# Patient Record
(demographics unavailable — no encounter records)

---

## 2024-12-16 NOTE — ASSESSMENT
[FreeTextEntry1] : 56-year-old man with sleep apnea and insomnia makes his condition complicated to deal with.  With several changes in his inspire settings finally we were able to adjust his device to 2.1 V with the 120/40 Hz for comfort.  Insomnia is fairly controlled with Lunesta 3 mg, patient understands long-term complications of this medication but he is has tried every other medication without any success.

## 2024-12-16 NOTE — HISTORY OF PRESENT ILLNESS
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 Patient was diagnosed with severe sleep apnea with AHI of 33 in 2021, he was not able to tolerate CPAP therapy and returned the equipment to DME company.  He underwent inspire placement november 22 of 2022.     symptoms prior to inspire : Patient is sleepy with [place] sleepiness score of 14.  Patient has very loud snoring, does not have any witnessed apneas.  Patient's bedtime is around 11 PM wakes up in the morning around 5 AM.  He feels tired when he wakes up. Patient also has frequent headaches, no nocturia.  HIs sleep is disturbed due to frequent awakenings. Ambien is helping with sleep quality for now. WIll stop it after inspire issue is addressed.  symptoms on inspire :  Patient is not sleepy with [place] sleepiness score of 4.  Patient's bedtime is around 11 PM wakes up in the morning around 6 AM.  He feels tired when he wakes up. He is getting about 4-5 hrs of sleep a night. Patient also has frequent headaches, no nocturia.  His sleep is not as disturbed with temazepam.   For insomnia - patient is not able to sleep well with ambien. Patient also has failed belsomra, quviviq and dayvigo. He is doing better with temazepam 15 mg at bedtime or lunesta 3 mg. He prefers lunesta as he is not groggy during the day.   sensation threshold 1.4 v functional threshold 1.8 v  he is on 1.8-2.8 v setting, +-+ configuration. Currently on 2.1 v setting. He is doing well with it ahi on the last study is 2.2. average usage has improved on inspire, now able to use more than 5 hrs. He has some discomfort with 2.1v, adjusted today to below setting which seems less uncomfortable. adjusted to 2.1 v, 120/40 hz

## 2025-06-09 NOTE — ASSESSMENT
[FreeTextEntry1] : 56-year-old man with sleep apnea and insomnia makes his condition complicated to deal with.  With several changes in his inspire settings finally we were able to adjust his device to 2.1 V with the 120/40 Hz for comfort.  Insomnia is fairly controlled with Lunesta 3 mg, patient understands long-term complications of this medication but he is has tried every other medication without any success.  Current usage of inspire is 3 hrs, He will try to increase the usage. Also considering zepbound (other glp 1's) as he is diagnosed with diabetes as well.

## 2025-06-09 NOTE — HISTORY OF PRESENT ILLNESS
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 Patient was diagnosed with severe sleep apnea with AHI of 33 in 2021, he was not able to tolerate CPAP therapy and returned the equipment to DME company.  He underwent inspire placement november 22 of 2022.     symptoms prior to inspire : Patient is sleepy with [place] sleepiness score of 14.  Patient has very loud snoring, does not have any witnessed apneas.  Patient's bedtime is around 11 PM wakes up in the morning around 5 AM.  He feels tired when he wakes up. Patient also has frequent headaches, no nocturia.  HIs sleep is disturbed due to frequent awakenings. Ambien is helping with sleep quality for now. WIll stop it after inspire issue is addressed.  symptoms on inspire :  Patient is not sleepy with [place] sleepiness score of 4.  Patient's bedtime is around 11 PM wakes up in the morning around 6 AM.  He feels tired when he wakes up. He is getting about 4-5 hrs of sleep a night. Patient also has frequent headaches, no nocturia.  His sleep is not as disturbed with temazepam.   For insomnia - patient is not able to sleep well with ambien. Patient also has failed belsomra, quviviq and dayvigo. He is on lunesta 3 mg. He prefers lunesta as he is not groggy during the day.   sensation threshold 1.4 v functional threshold 1.8 v  he is on 1.8-2.8 v setting, +-+ configuration. Currently on 2.1 v setting. He is doing well with it ahi on the last study is 2.2.  He has some discomfort with 2.1v, adjusted today to below setting which seems less uncomfortable. adjusted to 2.1 v, 120/40 hz he is able to use only 3 hrs.